# Patient Record
Sex: MALE | Race: WHITE | NOT HISPANIC OR LATINO | URBAN - METROPOLITAN AREA
[De-identification: names, ages, dates, MRNs, and addresses within clinical notes are randomized per-mention and may not be internally consistent; named-entity substitution may affect disease eponyms.]

---

## 2024-08-15 ENCOUNTER — HOSPITAL ENCOUNTER (EMERGENCY)
Facility: MEDICAL CENTER | Age: 29
End: 2024-08-15
Attending: STUDENT IN AN ORGANIZED HEALTH CARE EDUCATION/TRAINING PROGRAM

## 2024-08-15 ENCOUNTER — APPOINTMENT (OUTPATIENT)
Dept: RADIOLOGY | Facility: MEDICAL CENTER | Age: 29
End: 2024-08-15
Attending: EMERGENCY MEDICINE

## 2024-08-15 VITALS
SYSTOLIC BLOOD PRESSURE: 143 MMHG | DIASTOLIC BLOOD PRESSURE: 88 MMHG | WEIGHT: 169.09 LBS | HEART RATE: 76 BPM | TEMPERATURE: 98.1 F | BODY MASS INDEX: 25.63 KG/M2 | RESPIRATION RATE: 18 BRPM | OXYGEN SATURATION: 96 % | HEIGHT: 68 IN

## 2024-08-15 DIAGNOSIS — S60.10XA SUBUNGUAL HEMATOMA OF FINGER, INITIAL ENCOUNTER: ICD-10-CM

## 2024-08-15 DIAGNOSIS — M79.642 LEFT HAND PAIN: ICD-10-CM

## 2024-08-15 DIAGNOSIS — S60.222A CONTUSION OF LEFT HAND, INITIAL ENCOUNTER: ICD-10-CM

## 2024-08-15 PROCEDURE — 29125 APPL SHORT ARM SPLINT STATIC: CPT

## 2024-08-15 PROCEDURE — 99283 EMERGENCY DEPT VISIT LOW MDM: CPT

## 2024-08-15 PROCEDURE — 302874 HCHG BANDAGE ACE 2 OR 3""

## 2024-08-15 PROCEDURE — 73130 X-RAY EXAM OF HAND: CPT | Mod: LT

## 2024-08-16 NOTE — ED NOTES
"Justin Cao has been discharged from the Emergency Room.      Discharge instructions, which include signs and symptoms to monitor patient for, as well as detailed information regarding hand injury provided.  Patient verbalizes understanding of follow up care and medication management. All questions and concerns addressed at this time.     Patient provided with education on when to return to the ER and verbally understands with no concerns. Patient advised on setting up MyChart and information provided about patient survey.  Patient leaves ER in no apparent distress. This RN provided education regarding returning to the ER for any new concerns or changes in patient's condition.      BP (!) 143/88   Pulse 76   Temp 36.7 °C (98.1 °F)   Resp 18   Ht 1.727 m (5' 8\")   Wt 76.7 kg (169 lb 1.5 oz)   SpO2 96%   BMI 25.71 kg/m²   "

## 2024-08-16 NOTE — ED NOTES
PT ambulated to room from ED lobby to [facility] without assistant. PT placed on monitor. Chart up for next available ERP.

## 2024-08-16 NOTE — ED TRIAGE NOTES
".  Chief Complaint   Patient presents with    Hand Injury     Pt amb to triage for above. 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   Bruising noted to 5th digit. +swelling. Pt unable to make fist.  Pt educated on triage process and to alert staff if anything changes. Skin intact.   Aox4, GCS15 and speaking in complete sentences.   "

## 2024-08-16 NOTE — ED PROVIDER NOTES
"ED Provider Note    CHIEF COMPLAINT  Chief Complaint   Patient presents with    Hand Injury     EXTERNAL RECORDS REVIEWED  No acute events    HPI/ROS  LIMITATION TO HISTORY   Select: : None  OUTSIDE HISTORIAN(S):  none    Justin Sharon Cao is a 29 y.o. male who presents to the emergency room for injuries to his left hand.  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  While swinging the ax downwards he struck the bottom portion of his left hand against another portion of the stump causing immediate pain and discomfort.  He has noticed some concurrent injury to the distal portion of the fifth digit, a small subungual hematoma is also noted there he says his pain has been somewhat manageable though he can tell that something feels \"off.\"  He denies any numbness or tingling, has pain exacerbated by attempted flexion extension though he can engage this musculature and abduction and adduction are intact.  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  He is not on any medications currently.    PAST MEDICAL HISTORY   coarctation    SURGICAL HISTORY  patient denies any surgical history    FAMILY HISTORY  History reviewed. No pertinent family history.    SOCIAL HISTORY  Social History     Tobacco Use    Smoking status: Never    Smokeless tobacco: Never   Vaping Use    Vaping status: Some Days    Substances: Nicotine   Substance and Sexual Activity    Alcohol use: Yes     Comment: occ    Drug use: Never    Sexual activity: Not on file       CURRENT MEDICATIONS  Home Medications       Reviewed by Sariah Flor R.N. (Registered Nurse) on 08/15/24 at 1941  Med List Status: Partial     Medication Last Dose Status        Patient Manjeet Taking any Medications                         Audit from Redirected Encounters    **Home medications have not yet been reviewed for this encounter**         ALLERGIES  Allergies " "  Allergen Reactions    Amoxicillin        PHYSICAL EXAM  VITAL SIGNS: BP (!) 143/88   Pulse 76   Temp 36.7 °C (98.1 °F)   Resp 18   Ht 1.727 m (5' 8\")   Wt 76.7 kg (169 lb 1.5 oz)   SpO2 96%   BMI 25.71 kg/m²    Genl: M sitting in chair comfortably, speaking clearly, appears in no acute distress   Pulmonary: Lungs are clear to auscultation bilaterally  CV:  RRR, no murmur appreciated  Abdomen: soft, NT/ND; no rebound/guarding  Musculoskeletal: Pain free ROM of the neck. Moving upper and lower extremities in spontaneous and coordinated fashion  Left Upper Extremity:  - Skin: tenderness over distal portion of 5th metacarpal, distal finger swelling and subtle subungual hematoma that encompasses the end of the nail. No abrasions, no lacerations  - Motor: Full ROM at shoulder, elbow, wrist; 5/5 wrist flexion/extension, thumb IP joint flexion/extension (AIN/PIN), abduction/adduction (ulnar) all intact  - Sensation intact to median/ulnar/radial nerves  - 2+ radial pulse, < 2 sec cap refill x 5 digits    EKG/LABS  none    RADIOLOGY/PROCEDURES   I have independently interpreted the diagnostic imaging associated with this visit and am waiting the final reading from the radiologist.   My preliminary interpretation is as follows: No acute fracture, subluxation.    Radiologist interpretation:  DX-HAND 3+ LEFT   Final Result         1.  No acute traumatic bony injury.          COURSE & MEDICAL DECISION MAKING    ASSESSMENT, COURSE AND PLAN  Care Narrative: Patient presents emergency room with injury to the left metacarpal, MTP and phalanges.  This appears to be some element of soft tissue crush injury though cannot fully exclude crush injury.  This is several days out from initial onset and he had been irma taping this and I do not see evidence of a critical subungual hematoma that requires fenestration, there is no evidence of uniform finger swelling, no evidence of infection or penetrating injury.  X-ray shows no acute " traumatic injury at this time and the patient will be placed in a splint for comfort.  At this time patient is instructed on the need for repeat x-ray imaging at 1 week to make sure there is no nondisplaced fractures.    Discharged home in stable condition.    SPLINT PLACEMENT:  The patient was placed in a left hand ulnar gutter splint and the splint was applied by the tech with my direct supervision. Following splint application I rechecked the position and circulation and neuro function. The splint was in good position with good neurovascular function. The hand/fingers were well immobilized.    DISPOSITION AND DISCUSSIONS  I have discussed management of the patient with the following physicians and KRYSTLE's:  none    Discussion of management with other QHP or appropriate source(s): None     Escalation of care considered, and ultimately not performed:IV fluids and Laboratory analysis    Barriers to care at this time, including but not limited to: Patient does not have established PCP.     Decision tools and prescription drugs considered including, but not limited to:  none .    FINAL DIAGNOSIS  1. Contusion of left hand, initial encounter    2. Subungual hematoma of finger, initial encounter    3. Left hand pain      Electronically signed by: Shira Zabala M.D., 8/15/2024 7:57 PM